# Patient Record
Sex: MALE | Race: BLACK OR AFRICAN AMERICAN | NOT HISPANIC OR LATINO | URBAN - METROPOLITAN AREA
[De-identification: names, ages, dates, MRNs, and addresses within clinical notes are randomized per-mention and may not be internally consistent; named-entity substitution may affect disease eponyms.]

---
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
## 2021-05-07 ENCOUNTER — IMMUNIZATIONS (OUTPATIENT)
Dept: FAMILY MEDICINE CLINIC | Facility: HOSPITAL | Age: 21
End: 2021-05-07

## 2021-05-07 DIAGNOSIS — Z23 ENCOUNTER FOR IMMUNIZATION: Primary | ICD-10-CM

## 2021-05-07 PROCEDURE — 91300 SARS-COV-2 / COVID-19 MRNA VACCINE (PFIZER-BIONTECH) 30 MCG: CPT

## 2021-05-07 PROCEDURE — 0001A SARS-COV-2 / COVID-19 MRNA VACCINE (PFIZER-BIONTECH) 30 MCG: CPT

## 2021-06-02 ENCOUNTER — IMMUNIZATIONS (OUTPATIENT)
Dept: FAMILY MEDICINE CLINIC | Facility: HOSPITAL | Age: 21
End: 2021-06-02

## 2021-06-02 DIAGNOSIS — Z23 ENCOUNTER FOR IMMUNIZATION: Primary | ICD-10-CM

## 2021-06-02 PROCEDURE — 91300 SARS-COV-2 / COVID-19 MRNA VACCINE (PFIZER-BIONTECH) 30 MCG: CPT

## 2021-06-02 PROCEDURE — 0002A SARS-COV-2 / COVID-19 MRNA VACCINE (PFIZER-BIONTECH) 30 MCG: CPT

## 2023-06-17 ENCOUNTER — TELEPHONE (OUTPATIENT)
Age: 23
End: 2023-06-17

## 2023-06-26 NOTE — TELEPHONE ENCOUNTER
06/26/23 4:38 PM     The office's request has been received, reviewed, and the patient chart updated  The PCP has successfully been removed with a patient attribution note  This message will now be completed      Thank you  Lisa Gonzalez

## 2025-02-11 ENCOUNTER — OFFICE VISIT (OUTPATIENT)
Dept: FAMILY MEDICINE CLINIC | Facility: CLINIC | Age: 25
End: 2025-02-11
Payer: COMMERCIAL

## 2025-02-11 VITALS
HEIGHT: 71 IN | BODY MASS INDEX: 23.66 KG/M2 | DIASTOLIC BLOOD PRESSURE: 74 MMHG | TEMPERATURE: 97.6 F | HEART RATE: 53 BPM | OXYGEN SATURATION: 99 % | WEIGHT: 169 LBS | SYSTOLIC BLOOD PRESSURE: 139 MMHG

## 2025-02-11 DIAGNOSIS — H61.23 BILATERAL IMPACTED CERUMEN: ICD-10-CM

## 2025-02-11 DIAGNOSIS — Z13.220 SCREENING, LIPID: ICD-10-CM

## 2025-02-11 DIAGNOSIS — Z00.00 ENCOUNTER FOR MEDICAL EXAMINATION TO ESTABLISH CARE: ICD-10-CM

## 2025-02-11 DIAGNOSIS — Z13.1 SCREENING FOR DIABETES MELLITUS: ICD-10-CM

## 2025-02-11 DIAGNOSIS — R53.83 OTHER FATIGUE: ICD-10-CM

## 2025-02-11 DIAGNOSIS — F33.1 MODERATE EPISODE OF RECURRENT MAJOR DEPRESSIVE DISORDER (HCC): Primary | ICD-10-CM

## 2025-02-11 DIAGNOSIS — M72.2 PLANTAR FASCIITIS: ICD-10-CM

## 2025-02-11 PROBLEM — J45.20 MILD INTERMITTENT ASTHMA: Status: ACTIVE | Noted: 2025-02-11

## 2025-02-11 PROCEDURE — 99203 OFFICE O/P NEW LOW 30 MIN: CPT | Performed by: FAMILY MEDICINE

## 2025-02-11 RX ORDER — BUPROPION HYDROCHLORIDE 150 MG/1
150 TABLET ORAL EVERY MORNING
Qty: 30 TABLET | Refills: 5 | Status: SHIPPED | OUTPATIENT
Start: 2025-02-11 | End: 2025-08-10

## 2025-02-11 NOTE — ASSESSMENT & PLAN NOTE
Depression Screening Follow-up Plan: Patient's depression screening was positive with a PHQ-9 score of 15. Patient assessed for underlying major depression. They have no active suicidal ideations. Brief counseling provided and recommend additional follow-up/re-evaluation next office visit.    Prior history of ADHD and MDD in adolescences. Currently not on any therapy. Currently has life stressors due to a death in the family. He feels low energy fatigue and struggles with motivation. He states this causes him to have anxiety with difficulty to focus. Bipolar history in his mother. He tolerated ssri therapy in past. No signs of zelalem or hypomania on history.     We will trial Wellbutrin for depression and prior ADHD diagnosis (off-label). Urged to continue with outlets such as basket ball and his great family support system. Check on status at follow up in 1 month.    Orders:    buPROPion (WELLBUTRIN XL) 150 mg 24 hr tablet; Take 1 tablet (150 mg total) by mouth every morning

## 2025-02-11 NOTE — PROGRESS NOTES
Name: Arthur Gayle      : 2000      MRN: [id number]  Encounter Provider: Robert Spivey DO  Encounter Date: 2025   Encounter department: [facility]  :  Assessment & Plan  Moderate episode of recurrent major depressive disorder (HCC)  Depression Screening Follow-up Plan: Patient's depression screening was positive with a PHQ-9 score of 15. Patient assessed for underlying major depression. They have no active suicidal ideations. Brief counseling provided and recommend additional follow-up/re-evaluation next office visit.    Prior history of ADHD and MDD in adolescences. Currently not on any therapy. Currently has life stressors due to a death in the family. He feels low energy fatigue and struggles with motivation. He states this causes him to have anxiety with difficulty to focus. Bipolar history in his mother. He tolerated ssri therapy in past. No signs of zelalem or hypomania on history.     We will trial Wellbutrin for depression and prior ADHD diagnosis (off-label). Urged to continue with outlets such as basket ball and his great family support system. Check on status at follow up in 1 month.    Orders:    buPROPion (WELLBUTRIN XL) 150 mg 24 hr tablet; Take 1 tablet (150 mg total) by mouth every morning    Screening for diabetes mellitus    Orders:    Comprehensive metabolic panel; Future    Screening, lipid    Orders:    Lipid panel; Future    Other fatigue  Likely due to depression as noted above. Check organic metabolic causes as above and below.   Orders:    TSH, 3rd generation with Free T4 reflex; Future    Plantar fasciitis  Very mild pes planus on exam. Morning stiffness at bottom of feet. Mild tenderness at insertion point on calcaneous to palpation. Patient urged to roll foot over frozen water bottle to break up adhesions. Also can use Voltaren gel as needed.     Patient also notes many ankle sprains with bilateral ankle pain. More prominent on patients left.  No laxity with negative anterior drawer testing and talar tilt. He does have tenderness on left subtalar joint to palpation. Can consider medrol dose pack or referral to podiatry for injection and or inserts if persistent.   Orders:    Diclofenac Sodium (VOLTAREN) 1 %; Apply 2 g topically 4 (four) times a day    Bilateral impacted cerumen  Mild hearing loss (muffling). Likely due to cerumen. Will trial debrox and re-evaluate in office. If unable to be removed at next office visit will sent to ent for removal and hearing test.   Orders:    carbamide peroxide (DEBROX) 6.5 % otic solution; Administer 5 drops into both ears 2 (two) times a day    Encounter for medical examination to establish care               Depression Screening and Follow-up Plan: Patient's depression screening was positive with a PHQ-9 score of 15.         History of Present Illness   Patient presents to the office to establish care. Phq screening +  Notes prior hisotry of treated depression and adhd  Notes siblings have adhd and depression and mother has bipolar disporder  Denies any zelalem  Denies any issue with appetite or sleep  Denies any suicidal/homicidal ideation    Depression  This is a recurrent problem. The current episode started more than 1 month ago. The problem occurs constantly. The problem has been unchanged. Associated symptoms include fatigue and headaches. Pertinent negatives include no abdominal pain, change in bowel habit, chest pain, chills, congestion, coughing, diaphoresis, fever, nausea, numbness, visual change, vomiting or weakness. Exacerbated by: life events. family stressors. death of uncle. He has tried rest and sleep (exercise and basketball) for the symptoms. The treatment provided moderate relief.   Leg Pain   Incident onset: chronic. Incident location: no incident. There was no injury mechanism. Pain location: left and right foot. The quality of the pain is described as aching. The pain is mild. The pain has been  "Intermittent since onset. Pertinent negatives include no inability to bear weight, loss of motion, loss of sensation, muscle weakness, numbness or tingling. He reports no foreign bodies present. Exacerbated by: stiff in morning and after playing basketball. He has tried nothing for the symptoms.     Review of Systems   Constitutional:  Positive for fatigue. Negative for chills, diaphoresis and fever.   HENT:  Negative for congestion.    Respiratory:  Negative for cough, chest tightness and shortness of breath.    Cardiovascular:  Negative for chest pain and palpitations.   Gastrointestinal:  Negative for abdominal pain, blood in stool, change in bowel habit, diarrhea, nausea and vomiting.   Neurological:  Positive for headaches. Negative for dizziness, tingling, syncope, weakness and numbness.   Psychiatric/Behavioral:  Positive for depression. Negative for sleep disturbance and suicidal ideas. The patient is not nervous/anxious.        Objective   /74 (BP Location: Left arm, Patient Position: Sitting, Cuff Size: Standard)   Pulse (!) 53   Temp 97.6 °F (36.4 °C) (Temporal)   Ht 5' 10.87\" (1.8 m)   Wt 76.7 kg (169 lb)   SpO2 99%   BMI 23.66 kg/m²      Physical Exam  Constitutional:       General: He is not in acute distress.     Appearance: Normal appearance. He is normal weight. He is not ill-appearing or toxic-appearing.   HENT:      Head: Normocephalic and atraumatic.      Right Ear: There is impacted cerumen.      Left Ear: There is impacted cerumen.      Nose: No congestion or rhinorrhea.      Mouth/Throat:      Pharynx: No oropharyngeal exudate or posterior oropharyngeal erythema.   Eyes:      General: No scleral icterus.        Right eye: No discharge.         Left eye: No discharge.      Conjunctiva/sclera: Conjunctivae normal.   Cardiovascular:      Rate and Rhythm: Normal rate and regular rhythm.      Pulses: Normal pulses.           Dorsalis pedis pulses are 2+ on the right side and 2+ on the " left side.      Heart sounds: Normal heart sounds. No murmur heard.  Pulmonary:      Effort: Pulmonary effort is normal. No respiratory distress.      Breath sounds: Normal breath sounds. No stridor. No wheezing or rhonchi.   Abdominal:      General: There is no distension.      Palpations: Abdomen is soft.      Tenderness: There is no abdominal tenderness. There is no guarding.   Musculoskeletal:      Right lower leg: No edema.      Left lower leg: No edema.      Right foot: Normal range of motion and normal capillary refill. Tenderness (plantar facia insertion point on calcaneous) present. No deformity, bunion, foot drop, laceration, bony tenderness or crepitus. Normal pulse.      Left foot: Normal range of motion and normal capillary refill. Tenderness (plantar facia insertion point on calcaneous + subtar joint) present. No deformity, bunion, foot drop, laceration, bony tenderness or crepitus. Normal pulse.      Comments: Negative anterior drawer and talar tilt bilateral ankles   Feet:      Right foot:      Skin integrity: Skin integrity normal.      Toenail Condition: Right toenails are normal.      Left foot:      Skin integrity: Skin integrity normal.      Toenail Condition: Left toenails are normal.   Skin:     General: Skin is warm.   Neurological:      General: No focal deficit present.      Mental Status: He is alert and oriented to person, place, and time. Mental status is at baseline.   Psychiatric:         Mood and Affect: Mood normal.         Behavior: Behavior normal.         Thought Content: Thought content normal.       Robert Spivey, DO  PGY-2 Family Medicine